# Patient Record
Sex: MALE | Race: WHITE | NOT HISPANIC OR LATINO | Employment: FULL TIME | URBAN - METROPOLITAN AREA
[De-identification: names, ages, dates, MRNs, and addresses within clinical notes are randomized per-mention and may not be internally consistent; named-entity substitution may affect disease eponyms.]

---

## 2017-11-10 ENCOUNTER — TELEPHONE (OUTPATIENT)
Dept: FAMILY MEDICINE | Facility: CLINIC | Age: 32
End: 2017-11-10

## 2017-11-10 ENCOUNTER — HOSPITAL ENCOUNTER (OUTPATIENT)
Dept: RADIOLOGY | Facility: HOSPITAL | Age: 32
Discharge: HOME | End: 2017-11-10
Attending: FAMILY MEDICINE
Payer: COMMERCIAL

## 2017-11-10 ENCOUNTER — OFFICE VISIT (OUTPATIENT)
Dept: FAMILY MEDICINE | Facility: CLINIC | Age: 32
End: 2017-11-10
Payer: COMMERCIAL

## 2017-11-10 VITALS
RESPIRATION RATE: 18 BRPM | HEIGHT: 73 IN | HEART RATE: 98 BPM | WEIGHT: 198.88 LBS | BODY MASS INDEX: 26.36 KG/M2 | TEMPERATURE: 98 F | SYSTOLIC BLOOD PRESSURE: 138 MMHG | DIASTOLIC BLOOD PRESSURE: 86 MMHG

## 2017-11-10 DIAGNOSIS — M54.2 PAIN IN THE NECK: ICD-10-CM

## 2017-11-10 DIAGNOSIS — J02.9 SORE THROAT: ICD-10-CM

## 2017-11-10 DIAGNOSIS — J02.9 SORE THROAT: Primary | ICD-10-CM

## 2017-11-10 LAB
CTP QC/QA: YES
S PYO RRNA THROAT QL PROBE: NEGATIVE

## 2017-11-10 PROCEDURE — 70491 CT SOFT TISSUE NECK W/DYE: CPT | Mod: 26,,, | Performed by: RADIOLOGY

## 2017-11-10 PROCEDURE — 99214 OFFICE O/P EST MOD 30 MIN: CPT | Mod: 25,S$GLB,, | Performed by: FAMILY MEDICINE

## 2017-11-10 PROCEDURE — 87880 STREP A ASSAY W/OPTIC: CPT | Mod: QW,S$GLB,, | Performed by: FAMILY MEDICINE

## 2017-11-10 PROCEDURE — 70491 CT SOFT TISSUE NECK W/DYE: CPT | Mod: TC,PO

## 2017-11-10 PROCEDURE — 99999 PR PBB SHADOW E&M-EST. PATIENT-LVL III: CPT | Mod: PBBFAC,,, | Performed by: FAMILY MEDICINE

## 2017-11-10 PROCEDURE — 25500020 PHARM REV CODE 255: Mod: PO | Performed by: FAMILY MEDICINE

## 2017-11-10 RX ORDER — METHYLPREDNISOLONE 4 MG/1
TABLET ORAL
Qty: 1 PACKAGE | Refills: 0 | Status: SHIPPED | OUTPATIENT
Start: 2017-11-10 | End: 2018-01-02

## 2017-11-10 RX ADMIN — IOHEXOL 75 ML: 350 INJECTION, SOLUTION INTRAVENOUS at 12:11

## 2017-11-10 NOTE — TELEPHONE ENCOUNTER
----- Message from Maria Alejandra Noble MD sent at 11/10/2017  2:14 PM CST -----  Call patient: CT scan of the neck was negative for any pocket of infection.  I can give him a short course of steroids for the inflammation in neck and throat, if he would like.  Otherwise, he could take ibuprofen 800 mg 3 times daily with meals.

## 2017-11-10 NOTE — PROGRESS NOTES
CHIEF COMPLAINT: This is a 31-year-old male complaining of neck pain.    SUBJECTIVE: Patient reports onset of pain in neck  4 days ago.  In the beginning, discomfort was very mild but became increasingly more severe with time.  Patient took Advil, but pain became worse and 48 hours ago was unable to go to work.  Pain is localized to the right side of his neck and radiates to posterior neck.  He reports that it feels like he got hip by a baseball bat and describes the quality of the pain as throbbing in nature. He rates the pain 8 out of 10 on the pain scale.  Patient complains of pain and stiffness with movement.  He states that when he swallows, the muscles within his throat and around his neck hurt.  He seems to indicate that it's hard to swallow because of pain in his throat.  He denies fever, chills, nasal congestion, postnasal drip, runny nose, ear pain or hearing loss.    ROS:  GENERAL: Patient denies fever, chills, night sweats.  Patient denies weight gain or loss. Patient denies anorexia, fatigue, weakness or swollen glands.  SKIN: Patient denies rash.  HEENT: Patient denies, hearing loss, visual disturbance, eye irritation or discharge.  LUNGS: Patient denies cough, wheeze or hemoptysis.  CARDIOVASCULAR: Patient denies chest pain, shortness of breath, palpitations, syncope or lower extremity edema.  GI: Patient denies abdominal pain, nausea, vomiting, diarrhea, constipation, blood in stool or melena.  GENITOURINARY:  Patient denies dysuria, frequency, hematuria, nocturia, urgency or incontinence.  MUSCULOSKELETAL: Patient denies joint pain, swelling, redness or warmth.  NEUROLOGIC: Patient denies headache, vertigo, paresthesias, weakness in limb, dysarthria, or abnormality of gait.    OBJECTIVE:   GENERAL: Well-developed well-nourished white male alert and oriented x3 in no acute distress.  Memory, judgment and cognition without deficit.  Holding neck stiffly.  SKIN: Clear without rash.  Normal color and  tone.  HEENT: Eyes: Clear conjunctivae.  No scleral icterus.  Ears: Clear canals.  Clear TMs.  Nose: Without congestion.  Pharynx: Without injection or exudates.  NECK: Decreased range of motion due to pain.  Right-sided tenderness with palpable enlarged anterior cervical lymph node. No JVD.  Carotids 2+ and equal.  No bruits.  LUNGS: Clear to auscultation.  Normal respiratory effort.  CARDIOVASCULAR: Regular rhythm, normal S1, S2 without murmur, gallop or rub.  BACK: No CVA or spinal tenderness.  ABDOMEN: Soft, nontender without mass or organomegaly.  No rebound or guarding.  EXTREMITIES: Without cyanosis, clubbing or edema.  Distal pulses 2+ and equal.  Normal range of motion in all extremities.  No joint effusion, erythema or warmth.  NEUROLOGIC:   Cranial nerves II through XII without deficit.  Motor strength equal bilaterally.  Sensation normal to touch.  Deep tendon reflexes 2+ and equal.  Gait without abnormality.  No tremor.      Rapid strep screen: Negative.      ASSESSMENT:  Right sided neck pain with lymphadenopathy and difficulty swallowing.  Rule out deep neck space infection.    PLAN:   1.  CT soft tissue neck with contrast.  2.  If CT negative, will prescribe Medrol Dosepak.  3.  Apply moist heat and/or ice 4 times a day for 15-20 minutes.  4.  Ibuprofen 600-800 mg every 4-6 hours as needed for pain.  5.  Seek medical attention for worsening symptoms.  6.  Follow-up if no improvement.

## 2017-11-10 NOTE — TELEPHONE ENCOUNTER
Pt notified of CT results  Verbalized understanding  States he will take (4) 200 mg of IBU that he has at home already   Request you send the steroid to the pharmacy

## 2017-11-10 NOTE — TELEPHONE ENCOUNTER
----- Message from Abimael Acevedo sent at 11/10/2017  3:56 PM CST -----  Contact: self [phone]  Pt would like to consult with nurse regarding medication, pharmacy states they have not received prescription for medication.  Please call back at [phone].    Pt uses:    [id number] [address] - [address]  [address] [address]  Phone: [phone] Fax: [phone]

## 2018-01-02 ENCOUNTER — OFFICE VISIT (OUTPATIENT)
Dept: FAMILY MEDICINE | Facility: CLINIC | Age: 33
End: 2018-01-02
Payer: COMMERCIAL

## 2018-01-02 VITALS
TEMPERATURE: 99 F | BODY MASS INDEX: 26.06 KG/M2 | HEART RATE: 104 BPM | DIASTOLIC BLOOD PRESSURE: 78 MMHG | HEIGHT: 73 IN | SYSTOLIC BLOOD PRESSURE: 110 MMHG | WEIGHT: 196.63 LBS | OXYGEN SATURATION: 99 % | RESPIRATION RATE: 16 BRPM

## 2018-01-02 DIAGNOSIS — R05.9 COUGH: ICD-10-CM

## 2018-01-02 DIAGNOSIS — J11.1 INFLUENZA: Primary | ICD-10-CM

## 2018-01-02 PROCEDURE — 99999 PR PBB SHADOW E&M-EST. PATIENT-LVL III: CPT | Mod: PBBFAC,,, | Performed by: FAMILY MEDICINE

## 2018-01-02 PROCEDURE — 99214 OFFICE O/P EST MOD 30 MIN: CPT | Mod: S$GLB,,, | Performed by: FAMILY MEDICINE

## 2018-01-02 RX ORDER — AZITHROMYCIN 1 G/1
1 POWDER, FOR SUSPENSION ORAL ONCE
COMMUNITY
End: 2018-01-02 | Stop reason: ALTCHOICE

## 2018-01-02 RX ORDER — BENZONATATE 100 MG/1
100 CAPSULE ORAL 3 TIMES DAILY PRN
COMMUNITY
End: 2018-01-02 | Stop reason: ALTCHOICE

## 2018-01-02 RX ORDER — HYDROCODONE POLISTIREX AND CHLORPHENIRAMINE POLISTIREX 10; 8 MG/5ML; MG/5ML
5 SUSPENSION, EXTENDED RELEASE ORAL EVERY 12 HOURS PRN
Qty: 115 ML | Refills: 0 | Status: SHIPPED | OUTPATIENT
Start: 2018-01-02 | End: 2018-01-09

## 2018-01-02 RX ORDER — OSELTAMIVIR PHOSPHATE 75 MG/1
75 CAPSULE ORAL 2 TIMES DAILY
Qty: 10 CAPSULE | Refills: 0 | Status: SHIPPED | OUTPATIENT
Start: 2018-01-02 | End: 2018-01-07

## 2018-01-02 NOTE — PROGRESS NOTES
"Subjective:      Patient ID: Santhosh Smiley is a 32 y.o. male.    Chief Complaint: Cough      No past medical history on file.  Past Surgical History:   Procedure Laterality Date    NASAL SEPTUM SURGERY  03/2015     No family history on file.  Social History     Social History    Marital status: Single     Spouse name: N/A    Number of children: N/A    Years of education: N/A     Occupational History    Not on file.     Social History Main Topics    Smoking status: Never Smoker    Smokeless tobacco: Never Used    Alcohol use No    Drug use: Unknown    Sexual activity: Not on file     Other Topics Concern    Not on file     Social History Narrative    No narrative on file       Current Outpatient Prescriptions:     hydrocodone-chlorpheniramine (TUSSIONEX) 10-8 mg/5 mL suspension, Take 5 mLs by mouth every 12 (twelve) hours as needed for Cough., Disp: 115 mL, Rfl: 0    oseltamivir (TAMIFLU) 75 MG capsule, Take 1 capsule (75 mg total) by mouth 2 (two) times daily., Disp: 10 capsule, Rfl: 0  Review of patient's allergies indicates:  No Known Allergies    Review of Systems   Constitutional: Positive for appetite change, fatigue and fever. Negative for chills.   HENT: Positive for congestion, sinus pain and sinus pressure.    Respiratory: Positive for cough. Negative for shortness of breath and wheezing.    Cardiovascular: Negative for chest pain and palpitations.   Gastrointestinal: Negative for abdominal pain and blood in stool.   Musculoskeletal: Positive for myalgias.   Neurological: Positive for headaches.     HPI  One week of minor cold followed by feeling sick starting new years sohail.  + cough, fatigue, myalgias, malaise, fevers.  Persistent symptoms.    Objective:   /78 (BP Location: Right arm, Patient Position: Sitting, BP Method: Small (Manual))   Pulse 104   Temp 98.9 °F (37.2 °C) (Oral)   Resp 16   Ht 6' 1" (1.854 m)   Wt 89.2 kg (196 lb 10.4 oz)   SpO2 99%   BMI 25.94 kg/m²    "   Physical Exam   Constitutional: He is oriented to person, place, and time. He is cooperative. He has a sickly appearance. No distress.   HENT:   Right Ear: Hearing, tympanic membrane, external ear and ear canal normal.   Left Ear: Hearing, tympanic membrane, external ear and ear canal normal.   Mouth/Throat: Uvula is midline, oropharynx is clear and moist and mucous membranes are normal.   Eyes: Conjunctivae and EOM are normal.   Cardiovascular: Normal rate, regular rhythm and normal heart sounds.    Pulmonary/Chest: Effort normal and breath sounds normal.   Musculoskeletal: He exhibits no edema.   Neurological: He is alert and oriented to person, place, and time. Coordination and gait normal.   Skin: Skin is warm, dry and intact. No rash noted. He is not diaphoretic. No erythema.   Psychiatric: He has a normal mood and affect. His speech is normal and behavior is normal.   Nursing note and vitals reviewed.          Assessment:       1. Influenza    2. Cough            Plan:         Influenza    Cough    Other orders  -     oseltamivir (TAMIFLU) 75 MG capsule; Take 1 capsule (75 mg total) by mouth 2 (two) times daily.  Dispense: 10 capsule; Refill: 0  -     hydrocodone-chlorpheniramine (TUSSIONEX) 10-8 mg/5 mL suspension; Take 5 mLs by mouth every 12 (twelve) hours as needed for Cough.  Dispense: 115 mL; Refill: 0        Patient Care Team:  Mykel Lam MD as PCP - General (Internal Medicine)    Return if symptoms worsen or fail to improve.

## 2019-05-28 ENCOUNTER — OFFICE VISIT (OUTPATIENT)
Dept: INTERNAL MEDICINE | Facility: CLINIC | Age: 34
End: 2019-05-28
Payer: COMMERCIAL

## 2019-05-28 VITALS
WEIGHT: 199.94 LBS | DIASTOLIC BLOOD PRESSURE: 96 MMHG | HEIGHT: 73 IN | TEMPERATURE: 98 F | RESPIRATION RATE: 20 BRPM | HEART RATE: 79 BPM | SYSTOLIC BLOOD PRESSURE: 134 MMHG | BODY MASS INDEX: 26.5 KG/M2 | OXYGEN SATURATION: 99 %

## 2019-05-28 DIAGNOSIS — H92.02 LEFT EAR PAIN: Primary | ICD-10-CM

## 2019-05-28 DIAGNOSIS — H66.92 LEFT OTITIS MEDIA, UNSPECIFIED OTITIS MEDIA TYPE: ICD-10-CM

## 2019-05-28 PROCEDURE — 99999 PR PBB SHADOW E&M-EST. PATIENT-LVL III: CPT | Mod: PBBFAC,,, | Performed by: NURSE PRACTITIONER

## 2019-05-28 PROCEDURE — 99214 OFFICE O/P EST MOD 30 MIN: CPT | Mod: S$GLB,,, | Performed by: NURSE PRACTITIONER

## 2019-05-28 PROCEDURE — 3008F PR BODY MASS INDEX (BMI) DOCUMENTED: ICD-10-PCS | Mod: CPTII,S$GLB,, | Performed by: NURSE PRACTITIONER

## 2019-05-28 PROCEDURE — 3008F BODY MASS INDEX DOCD: CPT | Mod: CPTII,S$GLB,, | Performed by: NURSE PRACTITIONER

## 2019-05-28 PROCEDURE — 99999 PR PBB SHADOW E&M-EST. PATIENT-LVL III: ICD-10-PCS | Mod: PBBFAC,,, | Performed by: NURSE PRACTITIONER

## 2019-05-28 PROCEDURE — 99214 PR OFFICE/OUTPT VISIT, EST, LEVL IV, 30-39 MIN: ICD-10-PCS | Mod: S$GLB,,, | Performed by: NURSE PRACTITIONER

## 2019-05-28 RX ORDER — NEOMYCIN SULFATE, POLYMYXIN B SULFATE AND HYDROCORTISONE 10; 3.5; 1 MG/ML; MG/ML; [USP'U]/ML
SUSPENSION/ DROPS AURICULAR (OTIC)
Qty: 10 ML | Refills: 0 | Status: SHIPPED | OUTPATIENT
Start: 2019-05-28 | End: 2019-07-22 | Stop reason: ALTCHOICE

## 2019-05-28 RX ORDER — AMOXICILLIN 875 MG/1
875 TABLET, FILM COATED ORAL EVERY 12 HOURS
Qty: 20 TABLET | Refills: 0 | Status: SHIPPED | OUTPATIENT
Start: 2019-05-28 | End: 2019-07-22 | Stop reason: ALTCHOICE

## 2019-05-28 NOTE — PROGRESS NOTES
Subjective:       Patient ID: Santhosh Smiley is a 33 y.o. male.    Chief Complaint: Otitis Media (Left)    Patient presents with left ear pain.  Reports history of frequent ear infections and the symptoms feel about the same.      Otalgia    There is pain in the left ear. This is a new problem. The current episode started in the past 7 days. The problem occurs constantly. There has been no fever. The pain is mild. Pertinent negatives include no coughing, ear discharge, hearing loss or rhinorrhea.     Review of Systems   Constitutional: Negative for chills and fatigue.   HENT: Positive for ear pain. Negative for ear discharge, facial swelling, hearing loss and rhinorrhea.    Respiratory: Negative for cough and shortness of breath.    Musculoskeletal: Negative for arthralgias, gait problem and myalgias.   Psychiatric/Behavioral: Negative for agitation and confusion.       Objective:      Physical Exam   Constitutional: He is oriented to person, place, and time. Vital signs are normal. He appears well-developed and well-nourished.   HENT:   Head: Normocephalic and atraumatic.   Right Ear: Hearing normal. No swelling or tenderness. Tympanic membrane is erythematous.   Left Ear: Hearing normal.   Nose: Nose normal.   Mouth/Throat: Uvula is midline and oropharynx is clear and moist.   Neck: Normal range of motion.   Cardiovascular: Normal rate and regular rhythm.   Pulmonary/Chest: Effort normal and breath sounds normal.   Musculoskeletal: Normal range of motion.   Neurological: He is alert and oriented to person, place, and time.   Psychiatric: He has a normal mood and affect. His behavior is normal.       Assessment:       1. Left ear pain    2. Left otitis media, unspecified otitis media type        Plan:         Left ear pain    Left otitis media, unspecified otitis media type  -     amoxicillin (AMOXIL) 875 MG tablet; Take 1 tablet (875 mg total) by mouth every 12 (twelve) hours. Take with food  Dispense: 20  tablet; Refill: 0  -     neomycin-polymyxin-hydrocortisone (CORTISPORIN) 3.5-10,000-1 mg/mL-unit/mL-% otic suspension; Four drops four times a day to affected ear.  Dispense: 10 mL; Refill: 0        Instructed to take all medications as ordered.  Informed if no improvement or symptoms worsens to follow up with primary care physician.   Printed and review after visit summary with patient.

## 2019-07-22 ENCOUNTER — OFFICE VISIT (OUTPATIENT)
Dept: FAMILY MEDICINE | Facility: CLINIC | Age: 34
End: 2019-07-22
Payer: COMMERCIAL

## 2019-07-22 VITALS
OXYGEN SATURATION: 98 % | WEIGHT: 202.63 LBS | HEART RATE: 79 BPM | DIASTOLIC BLOOD PRESSURE: 84 MMHG | HEIGHT: 73 IN | BODY MASS INDEX: 26.85 KG/M2 | SYSTOLIC BLOOD PRESSURE: 130 MMHG | TEMPERATURE: 99 F

## 2019-07-22 DIAGNOSIS — L02.416 ABSCESS OF LEFT THIGH: Primary | ICD-10-CM

## 2019-07-22 PROCEDURE — 96372 PR INJECTION,THERAP/PROPH/DIAG2ST, IM OR SUBCUT: ICD-10-PCS | Mod: 59,S$GLB,, | Performed by: FAMILY MEDICINE

## 2019-07-22 PROCEDURE — 96372 THER/PROPH/DIAG INJ SC/IM: CPT | Mod: 59,S$GLB,, | Performed by: FAMILY MEDICINE

## 2019-07-22 PROCEDURE — 99999 PR PBB SHADOW E&M-EST. PATIENT-LVL III: CPT | Mod: PBBFAC,,, | Performed by: FAMILY MEDICINE

## 2019-07-22 PROCEDURE — 10061 I&D ABSCESS COMP/MULTIPLE: CPT | Mod: S$GLB,,, | Performed by: FAMILY MEDICINE

## 2019-07-22 PROCEDURE — 10061 PR DRAIN SKIN ABSCESS COMPLIC: ICD-10-PCS | Mod: S$GLB,,, | Performed by: FAMILY MEDICINE

## 2019-07-22 PROCEDURE — 99999 PR PBB SHADOW E&M-EST. PATIENT-LVL III: ICD-10-PCS | Mod: PBBFAC,,, | Performed by: FAMILY MEDICINE

## 2019-07-22 PROCEDURE — 3008F PR BODY MASS INDEX (BMI) DOCUMENTED: ICD-10-PCS | Mod: CPTII,S$GLB,, | Performed by: FAMILY MEDICINE

## 2019-07-22 PROCEDURE — 99212 OFFICE O/P EST SF 10 MIN: CPT | Mod: 25,S$GLB,, | Performed by: FAMILY MEDICINE

## 2019-07-22 PROCEDURE — 3008F BODY MASS INDEX DOCD: CPT | Mod: CPTII,S$GLB,, | Performed by: FAMILY MEDICINE

## 2019-07-22 PROCEDURE — 99212 PR OFFICE/OUTPT VISIT, EST, LEVL II, 10-19 MIN: ICD-10-PCS | Mod: 25,S$GLB,, | Performed by: FAMILY MEDICINE

## 2019-07-22 RX ORDER — CEFTRIAXONE 1 G/1
1 INJECTION, POWDER, FOR SOLUTION INTRAMUSCULAR; INTRAVENOUS
Status: COMPLETED | OUTPATIENT
Start: 2019-07-22 | End: 2019-07-22

## 2019-07-22 RX ORDER — SULFAMETHOXAZOLE AND TRIMETHOPRIM 800; 160 MG/1; MG/1
2 TABLET ORAL 2 TIMES DAILY
COMMUNITY
End: 2022-05-19

## 2019-07-22 RX ORDER — IBUPROFEN 800 MG/1
800 TABLET ORAL 3 TIMES DAILY
Qty: 60 TABLET | Refills: 1 | Status: SHIPPED | OUTPATIENT
Start: 2019-07-22 | End: 2022-05-19

## 2019-07-22 RX ORDER — TRAMADOL HYDROCHLORIDE 50 MG/1
50 TABLET ORAL EVERY 6 HOURS PRN
COMMUNITY
End: 2022-05-19

## 2019-07-22 RX ORDER — HYDROCODONE BITARTRATE AND ACETAMINOPHEN 5; 325 MG/1; MG/1
1-2 TABLET ORAL EVERY 6 HOURS PRN
Qty: 30 TABLET | Refills: 0 | Status: SHIPPED | OUTPATIENT
Start: 2019-07-22 | End: 2019-08-01

## 2019-07-22 RX ADMIN — CEFTRIAXONE 1 G: 1 INJECTION, POWDER, FOR SOLUTION INTRAMUSCULAR; INTRAVENOUS at 01:07

## 2019-07-22 NOTE — PROGRESS NOTES
CHIEF COMPLAINT:  This is a 33-year-old male complaining of persistent infection left inner thigh.    SUBJECTIVE:  Patient noted red tender bump left inner thigh Thursday evening while in the shower.  Area was painful to palpation.  The next day on Friday he had difficulty wearing pants because of discomfort in that area.  On Saturday, the area began to enlarge and redness spread of cost his thigh.  Yesterday, Sunday morning, he went to urgent care clinic and was given prescription for Bactrim DS and pain medication.  He has taken 6 doses of Bactrim.  Redness has spread just slightly outside boundary marked by healthcare provider yesterday.  Patient denies fever or chills.  He had abscess of skin of left knee requiring hospitalization and IV vancomycin 3 years ago.  Patient denies exposure to ill contacts.    ROS:  GENERAL: Patient denies fever, chills, night sweats.  Patient denies weight gain or loss. Patient denies anorexia, fatigue, weakness or swollen glands.  SKIN: Patient denies rash.  LUNGS: Patient denies cough, wheeze or hemoptysis.  CARDIOVASCULAR: Patient denies chest pain, shortness of breath, palpitations, syncope or lower extremity edema.  MUSCULOSKELETAL: Patient denies joint pain, swelling, redness or warmth.  NEUROLOGIC: Patient denies numbness, tingling or weakness in limb.    OBJECTIVE:   VITAL SIGNS:  Temperature 98.6°.  GENERAL: Well-developed well-nourished white male alert and oriented x3 in no acute distress.  Memory, judgment and cognition without deficit.  SKIN:  Approximately 2 cm central nodule left inner thigh with surrounding induration and erythema extending out in all directions up to 7.5 cm.  Central fluctuance.  HEENT: Eyes: Clear conjunctivae.  No scleral icterus.   NECK: Supple, normal range of motion.   LUNGS: Clear to auscultation.  Normal respiratory effort.  CARDIOVASCULAR: Regular rhythm, normal S1, S2 without murmur, gallop or rub.    Discussed potential complications of  procedure including bleeding, infection, and injury to nerve.  Patient understands and accepts risks.  Verbal consent obtained.    Procedure: After sterile prep and drape, area anesthetized with 1% Xylocaine with epinephrine.  Lesion incised with #11 blade.  Modest amount of purulent discharge evacuated (approx. 2.5 cc).  Cavity explored and irrigated with sterile water.  Cavity packed with 1/4 inch plain packing.  Dressing placed.    ASSESSMENT:  1. Abscess of left thigh      PLAN:   1.  Rocephin 1 g IM.  2.  Continue Bactrim DS 1 tablet twice daily.  3.  Ibuprofen 800 mg 3 times daily with food.  4.  Hydrocodone APAP 5-325 mg 1-2 tablets every 4-6 hours as needed for pain.  Dispense # 30.  No refills.  5.  Follow-up in 24 hr.  Remove packing.    This note is generated with speech recognition software and is subject to transcription error and sound alike phrases that may be missed by proofreading.

## 2019-07-23 ENCOUNTER — OFFICE VISIT (OUTPATIENT)
Dept: FAMILY MEDICINE | Facility: CLINIC | Age: 34
End: 2019-07-23
Payer: COMMERCIAL

## 2019-07-23 VITALS
RESPIRATION RATE: 18 BRPM | WEIGHT: 202.38 LBS | TEMPERATURE: 99 F | HEART RATE: 90 BPM | SYSTOLIC BLOOD PRESSURE: 126 MMHG | DIASTOLIC BLOOD PRESSURE: 80 MMHG | BODY MASS INDEX: 26.82 KG/M2 | HEIGHT: 73 IN | OXYGEN SATURATION: 97 %

## 2019-07-23 DIAGNOSIS — L02.415 ABSCESS OF RIGHT THIGH: Primary | ICD-10-CM

## 2019-07-23 PROCEDURE — 99999 PR PBB SHADOW E&M-EST. PATIENT-LVL III: ICD-10-PCS | Mod: PBBFAC,,, | Performed by: FAMILY MEDICINE

## 2019-07-23 PROCEDURE — 99024 POSTOP FOLLOW-UP VISIT: CPT | Mod: S$GLB,,, | Performed by: FAMILY MEDICINE

## 2019-07-23 PROCEDURE — 96372 PR INJECTION,THERAP/PROPH/DIAG2ST, IM OR SUBCUT: ICD-10-PCS | Mod: S$GLB,,, | Performed by: FAMILY MEDICINE

## 2019-07-23 PROCEDURE — 96372 THER/PROPH/DIAG INJ SC/IM: CPT | Mod: S$GLB,,, | Performed by: FAMILY MEDICINE

## 2019-07-23 PROCEDURE — 99024 PR POST-OP FOLLOW-UP VISIT: ICD-10-PCS | Mod: S$GLB,,, | Performed by: FAMILY MEDICINE

## 2019-07-23 PROCEDURE — 99999 PR PBB SHADOW E&M-EST. PATIENT-LVL III: CPT | Mod: PBBFAC,,, | Performed by: FAMILY MEDICINE

## 2019-07-23 RX ORDER — CEFTRIAXONE 1 G/1
1 INJECTION, POWDER, FOR SOLUTION INTRAMUSCULAR; INTRAVENOUS
Status: COMPLETED | OUTPATIENT
Start: 2019-07-23 | End: 2019-07-23

## 2019-07-23 RX ADMIN — CEFTRIAXONE 1 G: 1 INJECTION, POWDER, FOR SOLUTION INTRAMUSCULAR; INTRAVENOUS at 10:07

## 2019-07-24 NOTE — PROGRESS NOTES
CHIEF COMPLAINT:  This is a 33-year-old male here for follow-up I and D abscess left thigh.    SUBJECTIVE:  The patient reports no pain postprocedure until 4:00 a.m. this morning when he wakened with severe pain and had to take hydrocodone.  Patient reports different discomfort in thigh since incision was made requiring him to use crutches today.  He denies fever or chills.  He denies significant drainage from wound.    ROS:  GENERAL: Patient denies fever, chills, night sweats.  Patient denies weight gain or loss. Patient denies anorexia, fatigue, weakness or swollen glands.  SKIN: Patient denies rash.    OBJECTIVE:   VITAL SIGNS:  Temperature 99°.  GENERAL: Well-developed well-nourished white male alert and oriented x3 in no acute distress.  Memory, judgment and cognition without deficit.  SKIN:  Small amount of bloody discharge on dressing.   Decreased erythema of skin surrounding incision.  Packing removed.  Small amount of purulent discharge expressed.  Cavity irrigated with sterile water.  Cavity repacked with 1/4 inch packing.  Dressing placed.     ASSESSMENT:  1. Abscess of right thigh      PLAN:   1.  Rocephin 1 g IM.  2.  Follow-up in 48 hr.  3.  Seek medical attention for intractable fever or worsening condition.      This note is generated with speech recognition software and is subject to transcription error and sound alike phrases that may be missed by proofreading.

## 2019-07-25 ENCOUNTER — OFFICE VISIT (OUTPATIENT)
Dept: FAMILY MEDICINE | Facility: CLINIC | Age: 34
End: 2019-07-25
Payer: COMMERCIAL

## 2019-07-25 VITALS
HEIGHT: 73 IN | TEMPERATURE: 99 F | BODY MASS INDEX: 26.56 KG/M2 | SYSTOLIC BLOOD PRESSURE: 112 MMHG | OXYGEN SATURATION: 98 % | HEART RATE: 78 BPM | WEIGHT: 200.38 LBS | DIASTOLIC BLOOD PRESSURE: 76 MMHG

## 2019-07-25 DIAGNOSIS — L02.416 ABSCESS OF LEFT THIGH: Primary | ICD-10-CM

## 2019-07-25 PROCEDURE — 99999 PR PBB SHADOW E&M-EST. PATIENT-LVL III: ICD-10-PCS | Mod: PBBFAC,,, | Performed by: FAMILY MEDICINE

## 2019-07-25 PROCEDURE — 99999 PR PBB SHADOW E&M-EST. PATIENT-LVL III: CPT | Mod: PBBFAC,,, | Performed by: FAMILY MEDICINE

## 2019-07-25 PROCEDURE — 99024 POSTOP FOLLOW-UP VISIT: CPT | Mod: S$GLB,,, | Performed by: FAMILY MEDICINE

## 2019-07-25 PROCEDURE — 99024 PR POST-OP FOLLOW-UP VISIT: ICD-10-PCS | Mod: S$GLB,,, | Performed by: FAMILY MEDICINE

## 2019-07-25 NOTE — PROGRESS NOTES
CHIEF COMPLAINT:  This is a 33-year-old male here for follow-up I and D abscess left thigh.     SUBJECTIVE:  The patient reports decreased pain and redness.  He denies fever or chills.  He denies significant drainage from wound.  He has been taking Bactrim DS 2 tablets twice daily.     ROS:  GENERAL: Patient denies fever, chills, night sweats.  Patient denies weight gain or loss. Patient denies anorexia, fatigue, weakness or swollen glands.  SKIN: Patient denies rash.     OBJECTIVE:   VITAL SIGNS:  Temperature 99.4 °.  GENERAL: Well-developed well-nourished white male alert and oriented x3 in no acute distress.  Memory, judgment and cognition without deficit.  SKIN:  Small amount of bloody discharge on dressing.   Decreased erythema of skin surrounding incision.  Packing removed.  Small amount of purulent discharge expressed.  Cavity irrigated with sterile water.  Dressing placed.     ASSESSMENT:  1. Abscess of right thigh       PLAN:   1.  Decrease Bactrim DS to 1 tablet twice daily and complete antibiotic course.  2.  Seek medical attention for intractable fever or worsening condition.       This note is generated with speech recognition software and is subject to transcription error and sound alike phrases that may be missed by proofreading.

## 2021-03-31 ENCOUNTER — IMMUNIZATION (OUTPATIENT)
Dept: INTERNAL MEDICINE | Facility: CLINIC | Age: 36
End: 2021-03-31
Payer: COMMERCIAL

## 2021-03-31 DIAGNOSIS — Z23 NEED FOR VACCINATION: Primary | ICD-10-CM

## 2021-03-31 PROCEDURE — 91300 COVID-19, MRNA, LNP-S, PF, 30 MCG/0.3 ML DOSE VACCINE: CPT | Mod: PBBFAC | Performed by: FAMILY MEDICINE

## 2021-04-21 ENCOUNTER — IMMUNIZATION (OUTPATIENT)
Dept: INTERNAL MEDICINE | Facility: CLINIC | Age: 36
End: 2021-04-21
Payer: COMMERCIAL

## 2021-04-21 DIAGNOSIS — Z23 NEED FOR VACCINATION: Primary | ICD-10-CM

## 2021-04-21 PROCEDURE — 0002A COVID-19, MRNA, LNP-S, PF, 30 MCG/0.3 ML DOSE VACCINE: CPT | Mod: PBBFAC | Performed by: FAMILY MEDICINE

## 2021-04-21 PROCEDURE — 91300 COVID-19, MRNA, LNP-S, PF, 30 MCG/0.3 ML DOSE VACCINE: CPT | Mod: PBBFAC | Performed by: FAMILY MEDICINE

## 2022-05-19 ENCOUNTER — OFFICE VISIT (OUTPATIENT)
Dept: UROLOGY | Facility: CLINIC | Age: 37
End: 2022-05-19
Payer: COMMERCIAL

## 2022-05-19 VITALS
HEART RATE: 78 BPM | TEMPERATURE: 98 F | DIASTOLIC BLOOD PRESSURE: 84 MMHG | SYSTOLIC BLOOD PRESSURE: 137 MMHG | HEIGHT: 73 IN | BODY MASS INDEX: 26.51 KG/M2 | WEIGHT: 200 LBS

## 2022-05-19 DIAGNOSIS — N52.8 OTHER MALE ERECTILE DYSFUNCTION: Primary | ICD-10-CM

## 2022-05-19 PROCEDURE — 3008F BODY MASS INDEX DOCD: CPT | Mod: CPTII,S$GLB,, | Performed by: NURSE PRACTITIONER

## 2022-05-19 PROCEDURE — 99999 PR PBB SHADOW E&M-EST. PATIENT-LVL III: ICD-10-PCS | Mod: PBBFAC,,, | Performed by: NURSE PRACTITIONER

## 2022-05-19 PROCEDURE — 1159F PR MEDICATION LIST DOCUMENTED IN MEDICAL RECORD: ICD-10-PCS | Mod: CPTII,S$GLB,, | Performed by: NURSE PRACTITIONER

## 2022-05-19 PROCEDURE — 3075F PR MOST RECENT SYSTOLIC BLOOD PRESS GE 130-139MM HG: ICD-10-PCS | Mod: CPTII,S$GLB,, | Performed by: NURSE PRACTITIONER

## 2022-05-19 PROCEDURE — 99999 PR PBB SHADOW E&M-EST. PATIENT-LVL III: CPT | Mod: PBBFAC,,, | Performed by: NURSE PRACTITIONER

## 2022-05-19 PROCEDURE — 1159F MED LIST DOCD IN RCRD: CPT | Mod: CPTII,S$GLB,, | Performed by: NURSE PRACTITIONER

## 2022-05-19 PROCEDURE — 3079F DIAST BP 80-89 MM HG: CPT | Mod: CPTII,S$GLB,, | Performed by: NURSE PRACTITIONER

## 2022-05-19 PROCEDURE — 3008F PR BODY MASS INDEX (BMI) DOCUMENTED: ICD-10-PCS | Mod: CPTII,S$GLB,, | Performed by: NURSE PRACTITIONER

## 2022-05-19 PROCEDURE — 3079F PR MOST RECENT DIASTOLIC BLOOD PRESSURE 80-89 MM HG: ICD-10-PCS | Mod: CPTII,S$GLB,, | Performed by: NURSE PRACTITIONER

## 2022-05-19 PROCEDURE — 99204 PR OFFICE/OUTPT VISIT, NEW, LEVL IV, 45-59 MIN: ICD-10-PCS | Mod: S$GLB,,, | Performed by: NURSE PRACTITIONER

## 2022-05-19 PROCEDURE — 99204 OFFICE O/P NEW MOD 45 MIN: CPT | Mod: S$GLB,,, | Performed by: NURSE PRACTITIONER

## 2022-05-19 PROCEDURE — 3075F SYST BP GE 130 - 139MM HG: CPT | Mod: CPTII,S$GLB,, | Performed by: NURSE PRACTITIONER

## 2022-05-19 RX ORDER — SILDENAFIL 100 MG/1
100 TABLET, FILM COATED ORAL DAILY PRN
Qty: 20 TABLET | Refills: 5 | Status: SHIPPED | OUTPATIENT
Start: 2022-05-19 | End: 2023-04-25 | Stop reason: SDUPTHER

## 2022-05-19 RX ORDER — FLUTICASONE PROPIONATE 50 MCG
SPRAY, SUSPENSION (ML) NASAL
COMMUNITY
Start: 2022-01-06 | End: 2022-05-19

## 2022-05-19 NOTE — PROGRESS NOTES
Chief Complaint:   Erectile dysfunction    HPI:   Patient is a 36-year-old male that is presenting for erectile dysfunction.  Patient states that his wife just had their 4th child and he has 4 children under the age of 9. Patient reports that he has had several episodes of erectile dysfunction in the last 6 months and is causing concern and affecting his relationship.    Allergies:  Patient has no known allergies.    Medications:  has a current medication list which includes the following prescription(s): fluticasone propionate and sildenafil.    Review of Systems:  General: No fever, chills, fatigability, or weight loss.  Skin: No rashes, itching, or changes in color or texture of skin.  Chest: Denies SINGH, cyanosis, wheezing, cough, and sputum production.  Abdomen: Appetite fine. No weight loss. Denies diarrhea, abdominal pain, hematemesis, or blood in stool.  Musculoskeletal: No joint stiffness or swelling. Denies back pain.  : As above.  All other review of systems negative.    PMH:   has no past medical history on file.    PSH:   has a past surgical history that includes Nasal septum surgery (03/2015).    FamHx: family history is not on file.    SocHx:  reports that he has never smoked. He has never used smokeless tobacco. He reports that he does not drink alcohol. No history on file for drug use.      Physical Exam:  Vitals:    05/19/22 1513   BP: 137/84   Pulse: 78   Temp: 97.7 °F (36.5 °C)     General: A&Ox3, no apparent distress, no deformities  Neck: No masses, normal thyroid  Lungs: normal inspiration, no use of accessory muscles  Heart: normal pulse, no arrhythmias  Abdomen: Soft, NT, ND, no masses, no hernias, no hepatosplenomegaly    Impression/Plan:   Erectile dysfunction  Patient was reassured.  He has for children under the age of 9 and this is an acute onset of erectile dysfunction with no major comorbidities.  We will proceed with p.r.nCarmelina Campoverdea and draw a testosterone panel.  Patient to use my  chart as a follow-up.

## 2022-05-26 ENCOUNTER — PATIENT MESSAGE (OUTPATIENT)
Dept: UROLOGY | Facility: CLINIC | Age: 37
End: 2022-05-26
Payer: COMMERCIAL

## 2023-04-25 RX ORDER — SILDENAFIL 100 MG/1
100 TABLET, FILM COATED ORAL DAILY PRN
Qty: 20 TABLET | Refills: 5 | Status: SHIPPED | OUTPATIENT
Start: 2023-04-25

## 2024-01-10 ENCOUNTER — HOSPITAL ENCOUNTER (OUTPATIENT)
Dept: RADIOLOGY | Facility: HOSPITAL | Age: 39
Discharge: HOME | End: 2024-01-10
Attending: NURSE PRACTITIONER
Payer: COMMERCIAL

## 2024-01-10 ENCOUNTER — OFFICE VISIT (OUTPATIENT)
Dept: UROLOGY | Facility: CLINIC | Age: 39
End: 2024-01-10
Payer: COMMERCIAL

## 2024-01-10 VITALS
WEIGHT: 213.63 LBS | BODY MASS INDEX: 28.18 KG/M2 | DIASTOLIC BLOOD PRESSURE: 86 MMHG | SYSTOLIC BLOOD PRESSURE: 134 MMHG | HEART RATE: 101 BPM | RESPIRATION RATE: 18 BRPM

## 2024-01-10 DIAGNOSIS — R10.9 LEFT FLANK PAIN: Primary | ICD-10-CM

## 2024-01-10 DIAGNOSIS — R10.9 LEFT FLANK PAIN: ICD-10-CM

## 2024-01-10 PROCEDURE — 74176 CT ABD & PELVIS W/O CONTRAST: CPT | Mod: 26,,, | Performed by: RADIOLOGY

## 2024-01-10 PROCEDURE — 99214 OFFICE O/P EST MOD 30 MIN: CPT | Mod: S$GLB,,, | Performed by: NURSE PRACTITIONER

## 2024-01-10 PROCEDURE — 3079F DIAST BP 80-89 MM HG: CPT | Mod: CPTII,S$GLB,, | Performed by: NURSE PRACTITIONER

## 2024-01-10 PROCEDURE — 74176 CT ABD & PELVIS W/O CONTRAST: CPT | Mod: TC

## 2024-01-10 PROCEDURE — 99999 PR PBB SHADOW E&M-EST. PATIENT-LVL III: CPT | Mod: PBBFAC,,, | Performed by: NURSE PRACTITIONER

## 2024-01-10 PROCEDURE — 3008F BODY MASS INDEX DOCD: CPT | Mod: CPTII,S$GLB,, | Performed by: NURSE PRACTITIONER

## 2024-01-10 PROCEDURE — 3075F SYST BP GE 130 - 139MM HG: CPT | Mod: CPTII,S$GLB,, | Performed by: NURSE PRACTITIONER

## 2024-01-10 PROCEDURE — 1159F MED LIST DOCD IN RCRD: CPT | Mod: CPTII,S$GLB,, | Performed by: NURSE PRACTITIONER

## 2024-01-10 NOTE — PROGRESS NOTES
Chief Complaint:   Left flank pain    HPI:   Patient is a 38-year-old male that is presenting with acute onset of left flank pain.  Denies dysuria or gross hematuria.  No history of renal stones.  Urine in clinic is negative.  States that father has a history of renal stones.  Reports that pain started 2 days ago and has increased in intensity.    Allergies:  Patient has no known allergies.    Medications:  has a current medication list which includes the following prescription(s): sildenafil.    Review of Systems:  General: No fever, chills, fatigability, or weight loss.  Skin: No rashes, itching, or changes in color or texture of skin.  Chest: Denies SINGH, cyanosis, wheezing, cough, and sputum production.  Abdomen: Appetite fine. No weight loss. Denies diarrhea, abdominal pain, hematemesis, or blood in stool.  Musculoskeletal: +Left flank pain  : As above.  All other review of systems negative.    PMH:  none    PSH:   has a past surgical history that includes Nasal septum surgery (03/2015).    FamHx:  Father's history of renal stones    SocHx:  reports that he has never smoked. He has never used smokeless tobacco. He reports that he does not drink alcohol. No history on file for drug use.      Physical Exam:  Vitals:    01/10/24 0754   BP: 134/86   Pulse: 101   Resp: 18     General: A&Ox3, no apparent distress, no deformities  Neck: No masses, normal thyroid  Lungs: normal inspiration, no use of accessory muscles  Heart: normal pulse, no arrhythmias  Abdomen: Soft, NT, ND, no masses, no hernias, no  CVA tenderness    Labs/Studies:   See HPI    Impression/Plan:   Left flank pain  Urine in clinic is negative.  Patient was sent for CT renal stone study and will be contacted with results and needed follow-up.  If negative for renal stones will be referred to PCP for possible musculoskeletal workup.

## 2025-01-28 ENCOUNTER — OFFICE VISIT (OUTPATIENT)
Dept: UROLOGY | Facility: CLINIC | Age: 40
End: 2025-01-28
Payer: COMMERCIAL

## 2025-01-28 VITALS
BODY MASS INDEX: 27.77 KG/M2 | WEIGHT: 205 LBS | HEIGHT: 72 IN | RESPIRATION RATE: 14 BRPM | TEMPERATURE: 98 F | HEART RATE: 71 BPM | DIASTOLIC BLOOD PRESSURE: 96 MMHG | SYSTOLIC BLOOD PRESSURE: 143 MMHG

## 2025-01-28 DIAGNOSIS — N52.01 ERECTILE DYSFUNCTION DUE TO ARTERIAL INSUFFICIENCY: Primary | ICD-10-CM

## 2025-01-28 DIAGNOSIS — Z12.5 PROSTATE CANCER SCREENING: ICD-10-CM

## 2025-01-28 DIAGNOSIS — R53.83 OTHER FATIGUE: ICD-10-CM

## 2025-01-28 PROCEDURE — 99214 OFFICE O/P EST MOD 30 MIN: CPT | Mod: S$GLB,,, | Performed by: NURSE PRACTITIONER

## 2025-01-28 PROCEDURE — 3008F BODY MASS INDEX DOCD: CPT | Mod: CPTII,S$GLB,, | Performed by: NURSE PRACTITIONER

## 2025-01-28 PROCEDURE — 3077F SYST BP >= 140 MM HG: CPT | Mod: CPTII,S$GLB,, | Performed by: NURSE PRACTITIONER

## 2025-01-28 PROCEDURE — 3080F DIAST BP >= 90 MM HG: CPT | Mod: CPTII,S$GLB,, | Performed by: NURSE PRACTITIONER

## 2025-01-28 PROCEDURE — 1159F MED LIST DOCD IN RCRD: CPT | Mod: CPTII,S$GLB,, | Performed by: NURSE PRACTITIONER

## 2025-01-28 PROCEDURE — 99999 PR PBB SHADOW E&M-EST. PATIENT-LVL III: CPT | Mod: PBBFAC,,, | Performed by: NURSE PRACTITIONER

## 2025-01-28 NOTE — PROGRESS NOTES
Chief Complaint:   Erectile dysfunction    HPI:   Patient is a 39-year-old male that is presenting as a follow-up to erectile dysfunction.  Patient states that he just had his 5th child, for under 6 years old.  States that he is using sildenafil with good results.  Is concerned that he is having to use erectile dysfunction medication at the age of 38.  Patient states that life is busy at home with 5 children and work has become stressful.  01/10/2024  Patient is a 38-year-old male that is presenting with acute onset of left flank pain. Denies dysuria or gross hematuria. No history of renal stones. Urine in clinic is negative. States that father has a history of renal stones. Reports that pain started 2 days ago and has increased in intensity.   05/19/2022  Patient is a 36-year-old male that is presenting for erectile dysfunction.  Patient states that his wife just had their 4th child and he has 4 children under the age of 9. Patient reports that he has had several episodes of erectile dysfunction in the last 6 months and is causing concern and affecting his relationship.   Allergies:  Patient has no known allergies.    Medications:  has a current medication list which includes the following prescription(s): sildenafil.    Review of Systems:  General: No fever, chills, fatigability, or weight loss.  Skin: No rashes, itching, or changes in color or texture of skin.  Chest: Denies SINGH, cyanosis, wheezing, cough, and sputum production.  Abdomen: Appetite fine. No weight loss. Denies diarrhea, abdominal pain, hematemesis, or blood in stool.  Musculoskeletal: No joint stiffness or swelling. Denies back pain.  : As above.  All other review of systems negative.    PMH:  ED    PSH:   has a past surgical history that includes Nasal septum surgery (03/2015).    FamHx: family history is not on file.    SocHx:  reports that he has never smoked. He has never used smokeless tobacco. He reports that he does not drink alcohol. No  history on file for drug use.      Physical Exam:  Vitals:    01/28/25 1400   BP: (!) 143/96   Pulse: 71   Resp: 14   Temp: 97.7 °F (36.5 °C)     General: A&Ox3, no apparent distress, no deformities  Neck: No masses, normal thyroid  Lungs: normal inspiration, no use of accessory muscles  Heart: normal pulse, no arrhythmias  Abdomen: Soft, NT, ND, no masses, no hernias, no hepatosplenomegaly  Lymphatic: Neck and groin nodes negative  Skin: The skin is warm and dry. No jaundice.    Impression/Plan:   Had a lengthy discussion with patient related to work and raising 5 children can be an issue related to erectile dysfunction.  Stress can exacerbate ED. Will rule out hypogonadism.  Patient will be contacted with results of labs and needed follow-up.

## 2025-01-30 ENCOUNTER — LAB VISIT (OUTPATIENT)
Dept: LAB | Facility: HOSPITAL | Age: 40
End: 2025-01-30
Attending: NURSE PRACTITIONER
Payer: COMMERCIAL

## 2025-01-30 DIAGNOSIS — R53.83 OTHER FATIGUE: ICD-10-CM

## 2025-01-30 DIAGNOSIS — Z12.5 PROSTATE CANCER SCREENING: ICD-10-CM

## 2025-01-30 LAB
COMPLEXED PSA SERPL-MCNC: 0.74 NG/ML (ref 0–4)
PROLACTIN SERPL IA-MCNC: 5.1 NG/ML (ref 3.5–19.4)
TESTOST SERPL-MCNC: 224 NG/DL (ref 304–1227)

## 2025-01-30 PROCEDURE — 36415 COLL VENOUS BLD VENIPUNCTURE: CPT | Performed by: NURSE PRACTITIONER

## 2025-01-30 PROCEDURE — 84403 ASSAY OF TOTAL TESTOSTERONE: CPT | Performed by: NURSE PRACTITIONER

## 2025-01-30 PROCEDURE — 84146 ASSAY OF PROLACTIN: CPT | Performed by: NURSE PRACTITIONER

## 2025-01-30 PROCEDURE — 84153 ASSAY OF PSA TOTAL: CPT | Performed by: NURSE PRACTITIONER

## 2025-01-31 ENCOUNTER — PATIENT MESSAGE (OUTPATIENT)
Dept: UROLOGY | Facility: CLINIC | Age: 40
End: 2025-01-31
Payer: COMMERCIAL

## 2025-01-31 DIAGNOSIS — E29.1 TESTICULAR HYPOGONADISM: Primary | ICD-10-CM

## 2025-03-24 ENCOUNTER — LAB VISIT (OUTPATIENT)
Dept: LAB | Facility: HOSPITAL | Age: 40
End: 2025-03-24
Attending: NURSE PRACTITIONER
Payer: COMMERCIAL

## 2025-03-24 DIAGNOSIS — E29.1 TESTICULAR HYPOGONADISM: ICD-10-CM

## 2025-03-24 LAB — TESTOST SERPL-MCNC: 291 NG/DL (ref 304–1227)

## 2025-03-24 PROCEDURE — 36415 COLL VENOUS BLD VENIPUNCTURE: CPT

## 2025-03-24 PROCEDURE — 84403 ASSAY OF TOTAL TESTOSTERONE: CPT

## 2025-03-25 ENCOUNTER — RESULTS FOLLOW-UP (OUTPATIENT)
Dept: UROLOGY | Facility: CLINIC | Age: 40
End: 2025-03-25

## 2025-03-27 ENCOUNTER — PATIENT MESSAGE (OUTPATIENT)
Dept: UROLOGY | Facility: CLINIC | Age: 40
End: 2025-03-27
Payer: COMMERCIAL

## 2025-03-27 DIAGNOSIS — E29.1 TESTICULAR HYPOGONADISM: Primary | ICD-10-CM

## 2025-03-28 ENCOUNTER — TELEPHONE (OUTPATIENT)
Dept: UROLOGY | Facility: CLINIC | Age: 40
End: 2025-03-28
Payer: COMMERCIAL

## 2025-03-28 RX ORDER — SILDENAFIL 100 MG/1
100 TABLET, FILM COATED ORAL DAILY PRN
Qty: 20 TABLET | Refills: 5 | Status: SHIPPED | OUTPATIENT
Start: 2025-03-28

## 2025-04-02 ENCOUNTER — CLINICAL SUPPORT (OUTPATIENT)
Dept: UROLOGY | Facility: CLINIC | Age: 40
End: 2025-04-02
Payer: COMMERCIAL

## 2025-04-02 DIAGNOSIS — E29.1 TESTICULAR HYPOGONADISM: Primary | ICD-10-CM

## 2025-04-02 PROCEDURE — 99999 PR PBB SHADOW E&M-EST. PATIENT-LVL II: CPT | Mod: PBBFAC,,,

## 2025-04-02 RX ORDER — TESTOSTERONE CYPIONATE 200 MG/ML
200 INJECTION, SOLUTION INTRAMUSCULAR
Status: COMPLETED | OUTPATIENT
Start: 2025-04-02 | End: 2025-04-02

## 2025-04-02 RX ORDER — TESTOSTERONE CYPIONATE 200 MG/ML
200 INJECTION, SOLUTION INTRAMUSCULAR
Qty: 13 ML | Refills: 0 | Status: SHIPPED | OUTPATIENT
Start: 2025-04-02 | End: 2025-10-01

## 2025-04-02 RX ADMIN — TESTOSTERONE CYPIONATE 200 MG: 200 INJECTION, SOLUTION INTRAMUSCULAR at 03:04

## 2025-04-02 NOTE — PROGRESS NOTES
..Patient in today for Depo teaching.  Verbal instructions given to pt; Hands were washed and supplies were given. Explained to pt supplies needed, including medication, needle, alcohol wipe, bandaid and sharps container.  Pt successfully pippa up 200mg of Depo and injected it into his right anterior thigh. Pt was instructed to remain in clinic for 15 minutes and to report any adverse reaction to staff.

## 2025-04-07 DIAGNOSIS — E29.1 TESTICULAR HYPOGONADISM: ICD-10-CM

## 2025-04-07 RX ORDER — TESTOSTERONE CYPIONATE 200 MG/ML
200 INJECTION, SOLUTION INTRAMUSCULAR
Qty: 13 ML | Refills: 0 | Status: SHIPPED | OUTPATIENT
Start: 2025-04-07 | End: 2025-10-06

## 2025-04-14 ENCOUNTER — TELEPHONE (OUTPATIENT)
Dept: UROLOGY | Facility: CLINIC | Age: 40
End: 2025-04-14
Payer: COMMERCIAL

## 2025-04-14 DIAGNOSIS — E29.1 TESTICULAR HYPOGONADISM: ICD-10-CM

## 2025-04-14 RX ORDER — TESTOSTERONE CYPIONATE 200 MG/ML
200 INJECTION, SOLUTION INTRAMUSCULAR
Qty: 13 ML | Refills: 0 | Status: SHIPPED | OUTPATIENT
Start: 2025-04-14 | End: 2025-10-13

## 2025-04-15 ENCOUNTER — TELEPHONE (OUTPATIENT)
Dept: UROLOGY | Facility: CLINIC | Age: 40
End: 2025-04-15
Payer: COMMERCIAL

## 2025-04-15 NOTE — TELEPHONE ENCOUNTER
"Attempted to do a PA for pts testosterone and received this message "Information regarding your request  The member recently filled this medication and will be able to return for their next refill according to their plan limits.".  "

## 2025-05-08 ENCOUNTER — PATIENT MESSAGE (OUTPATIENT)
Dept: RESEARCH | Facility: HOSPITAL | Age: 40
End: 2025-05-08
Payer: COMMERCIAL

## 2025-05-13 ENCOUNTER — TELEPHONE (OUTPATIENT)
Dept: UROLOGY | Facility: CLINIC | Age: 40
End: 2025-05-13
Payer: COMMERCIAL

## 2025-05-13 DIAGNOSIS — E29.1 TESTICULAR HYPOGONADISM: ICD-10-CM

## 2025-05-13 RX ORDER — TESTOSTERONE CYPIONATE 200 MG/ML
200 INJECTION, SOLUTION INTRAMUSCULAR
Qty: 13 ML | Refills: 0 | Status: SHIPPED | OUTPATIENT
Start: 2025-05-13 | End: 2025-11-11

## 2025-05-13 NOTE — TELEPHONE ENCOUNTER
----- Message from Med Assistant Vasquez sent at 5/13/2025  1:50 PM CDT -----  Contact: Santhosh  Type:  Needing Return CallWho Called: Tigre Call Back For: [facility] the patient rather a call back or a response via [facility]? CallBest Call Back Number:  [phone] Information:

## 2025-06-23 ENCOUNTER — PATIENT OUTREACH (OUTPATIENT)
Dept: ADMINISTRATIVE | Facility: HOSPITAL | Age: 40
End: 2025-06-23
Payer: COMMERCIAL

## 2025-06-23 NOTE — PROGRESS NOTES
[employer] P4P report;PCP Removed, pt last saw Dr. Lam 6 years ago, called pt, he states he does his annual checkups through his employer.

## 2025-06-26 ENCOUNTER — OFFICE VISIT (OUTPATIENT)
Dept: UROLOGY | Facility: CLINIC | Age: 40
End: 2025-06-26
Payer: COMMERCIAL

## 2025-06-26 ENCOUNTER — LAB VISIT (OUTPATIENT)
Dept: LAB | Facility: HOSPITAL | Age: 40
End: 2025-06-26
Attending: NURSE PRACTITIONER
Payer: COMMERCIAL

## 2025-06-26 VITALS
DIASTOLIC BLOOD PRESSURE: 88 MMHG | BODY MASS INDEX: 27.77 KG/M2 | SYSTOLIC BLOOD PRESSURE: 129 MMHG | HEIGHT: 72 IN | WEIGHT: 205 LBS | HEART RATE: 84 BPM

## 2025-06-26 DIAGNOSIS — E29.1 TESTICULAR HYPOGONADISM: ICD-10-CM

## 2025-06-26 DIAGNOSIS — E29.1 TESTICULAR HYPOGONADISM: Primary | ICD-10-CM

## 2025-06-26 LAB
ALBUMIN SERPL BCP-MCNC: 4.6 G/DL (ref 3.5–5.2)
ALP SERPL-CCNC: 109 UNIT/L (ref 40–150)
ALT SERPL W/O P-5'-P-CCNC: 23 UNIT/L (ref 10–44)
AST SERPL-CCNC: 21 UNIT/L (ref 11–45)
BILIRUB DIRECT SERPL-MCNC: 0.2 MG/DL (ref 0.1–0.3)
BILIRUB SERPL-MCNC: 0.5 MG/DL (ref 0.1–1)
HCT VFR BLD AUTO: 46.7 % (ref 40–54)
PROT SERPL-MCNC: 7.6 GM/DL (ref 6–8.4)
TESTOST SERPL-MCNC: 364 NG/DL (ref 304–1227)

## 2025-06-26 PROCEDURE — 84460 ALANINE AMINO (ALT) (SGPT): CPT

## 2025-06-26 PROCEDURE — 3079F DIAST BP 80-89 MM HG: CPT | Mod: CPTII,S$GLB,, | Performed by: NURSE PRACTITIONER

## 2025-06-26 PROCEDURE — 3074F SYST BP LT 130 MM HG: CPT | Mod: CPTII,S$GLB,, | Performed by: NURSE PRACTITIONER

## 2025-06-26 PROCEDURE — 99999 PR PBB SHADOW E&M-EST. PATIENT-LVL III: CPT | Mod: PBBFAC,,, | Performed by: NURSE PRACTITIONER

## 2025-06-26 PROCEDURE — 1159F MED LIST DOCD IN RCRD: CPT | Mod: CPTII,S$GLB,, | Performed by: NURSE PRACTITIONER

## 2025-06-26 PROCEDURE — 99214 OFFICE O/P EST MOD 30 MIN: CPT | Mod: S$GLB,,, | Performed by: NURSE PRACTITIONER

## 2025-06-26 PROCEDURE — 3008F BODY MASS INDEX DOCD: CPT | Mod: CPTII,S$GLB,, | Performed by: NURSE PRACTITIONER

## 2025-06-26 PROCEDURE — 85014 HEMATOCRIT: CPT

## 2025-06-26 PROCEDURE — 36415 COLL VENOUS BLD VENIPUNCTURE: CPT

## 2025-06-26 PROCEDURE — 1160F RVW MEDS BY RX/DR IN RCRD: CPT | Mod: CPTII,S$GLB,, | Performed by: NURSE PRACTITIONER

## 2025-06-26 PROCEDURE — 84403 ASSAY OF TOTAL TESTOSTERONE: CPT

## 2025-06-26 RX ORDER — TESTOSTERONE CYPIONATE 200 MG/ML
200 INJECTION, SOLUTION INTRAMUSCULAR
Qty: 13 ML | Refills: 0 | Status: SHIPPED | OUTPATIENT
Start: 2025-06-26 | End: 2025-12-25

## 2025-06-26 NOTE — PROGRESS NOTES
Chief Complaint:   Hypogonadism    HPI:   Patient is a delightful 39-year-old male that has been followed by this clinic for hypogonadism and erectile dysfunction.  Patient is currently on testosterone replacement therapy self injections, 200 mg every 14 days.  States that it has greatly improved his energy level, libido and fatigue.  Is due for labs.    Allergies:  Patient has no known allergies.    Medications:  has a current medication list which includes the following prescription(s): sildenafil and testosterone cypionate.    Review of Systems:  General: No fever, chills, fatigability, or weight loss.  Skin: No rashes, itching, or changes in color or texture of skin.  Chest: Denies SINGH, cyanosis, wheezing, cough, and sputum production.  Abdomen: Appetite fine. No weight loss. Denies diarrhea, abdominal pain, hematemesis, or blood in stool.  Musculoskeletal: No joint stiffness or swelling. Denies back pain.  : As above.  All other review of systems negative.    PMH:  Testicular hypogonadism and erectile dysfunction    PSH:   has a past surgical history that includes Nasal septum surgery (03/2015).    FamHx: family history is not on file.    SocHx:  reports that he has never smoked. He has never used smokeless tobacco. He reports that he does not drink alcohol. No history on file for drug use.      Physical Exam:  Vitals:    06/26/25 0909   BP: 129/88   Pulse: 84     General: A&Ox3, no apparent distress, no deformities  Neck: No masses, normal thyroid  Lungs: normal inspiration, no use of accessory muscles  Heart: normal pulse, no arrhythmias  Abdomen: Soft, NT, ND, no masses, no hernias, no hepatosplenomegaly  Lymphatic: Neck and groin nodes negative  Skin: The skin is warm and dry. No jaundice.  Ext: No c/c/e.      Impression/Plan:   Hypogonadism  Patient requested that we change injection regimen to 100 mg every 7 days instead of 200 mg every 14 days, he will use the patient portal functionality as a  follow-up to change in injection regimen.  Sent for labs, will contact him with results.  Return to clinic in 6 months for re-evaluation.

## 2025-06-27 ENCOUNTER — RESULTS FOLLOW-UP (OUTPATIENT)
Dept: UROLOGY | Facility: CLINIC | Age: 40
End: 2025-06-27

## 2025-08-01 ENCOUNTER — PATIENT MESSAGE (OUTPATIENT)
Dept: UROLOGY | Facility: CLINIC | Age: 40
End: 2025-08-01
Payer: COMMERCIAL